# Patient Record
Sex: MALE | Race: WHITE
[De-identification: names, ages, dates, MRNs, and addresses within clinical notes are randomized per-mention and may not be internally consistent; named-entity substitution may affect disease eponyms.]

---

## 2017-08-18 ENCOUNTER — HOSPITAL ENCOUNTER (OUTPATIENT)
Dept: HOSPITAL 58 - NONPT | Age: 52
Discharge: HOME | End: 2017-08-18
Attending: GENERAL PRACTICE

## 2017-08-18 VITALS — BODY MASS INDEX: 31.5 KG/M2

## 2017-08-18 DIAGNOSIS — Z12.11: Primary | ICD-10-CM

## 2017-08-18 LAB
HEMOCCULT SP1 SPEC QL: NEGATIVE
OCCULT BLOOD INTERNAL QC 1: (no result)

## 2017-08-18 PROCEDURE — 82272 OCCULT BLD FECES 1-3 TESTS: CPT

## 2017-08-21 ENCOUNTER — HOSPITAL ENCOUNTER (OUTPATIENT)
Dept: HOSPITAL 58 - LAB | Age: 52
Discharge: HOME | End: 2017-08-21
Attending: GENERAL PRACTICE

## 2017-08-21 VITALS — BODY MASS INDEX: 31.5 KG/M2

## 2017-08-21 DIAGNOSIS — R35.0: Primary | ICD-10-CM

## 2017-08-21 DIAGNOSIS — Z12.5: ICD-10-CM

## 2017-08-21 DIAGNOSIS — Z76.89: ICD-10-CM

## 2017-08-21 DIAGNOSIS — I10: ICD-10-CM

## 2017-08-21 LAB
ADD URINE MICROSCOPIC: YES
ALBUMIN SERPL-MCNC: 4 G/DL (ref 3.4–5)
ALBUMIN/GLOB SERPL: 1.29 {RATIO}
ALP SERPL-CCNC: 46 U/L (ref 50–136)
ALT SERPL-CCNC: 12 U/L (ref 12–78)
ANION GAP SERPL CALC-SCNC: 13.2 MMOL/L
AST SERPL-CCNC: 18 U/L (ref 15–37)
BASOPHILS # BLD AUTO: 0.1 K/UL (ref 0–0.2)
BASOPHILS NFR BLD AUTO: 1.3 % (ref 0–3)
BILIRUB SERPL-MCNC: 0.44 MG/DL (ref 0–1.2)
BUN SERPL-MCNC: 12 MG/DL (ref 7–18)
BUN/CREAT SERPL: 13.95
CALCIUM SERPL-MCNC: 9.9 MG/DL (ref 8.2–10.2)
CHLORIDE SERPL-SCNC: 107 MMOL/L (ref 98–107)
CHOLEST SERPL-MCNC: 222 MG/DL (ref 0–200)
CHOLEST/HDLC SERPL: 4.1 {RATIO} (ref 4.5–6.4)
CO2 BLD-SCNC: 26 MMOL/L (ref 21–32)
CREAT SERPL-MCNC: 0.86 MG/DL (ref 0.6–1.1)
EOSINOPHIL # BLD AUTO: 0.2 K/UL (ref 0–0.7)
EOSINOPHIL NFR BLD AUTO: 3.2 % (ref 0–7)
GFR SERPLBLD BASED ON 1.73 SQ M-ARVRAT: 93 ML/MIN
GLOBULIN SER CALC-MCNC: 3.1 G/L
GLUCOSE SERPL-MCNC: 95 MG/DL (ref 70–100)
HCT VFR BLD AUTO: 33.7 % (ref 42–52)
HDLC SERPL-MCNC: 54 MG/DL (ref 35–60)
HGB BLD-MCNC: 11.6 G/DL (ref 14–18)
IMM GRANULOCYTES NFR BLD AUTO: 0.2 % (ref 0–5)
LYMPHOCYTES # SPEC AUTO: 2.2 K/UL (ref 0.6–3.4)
LYMPHOCYTES NFR BLD AUTO: 40.9 % (ref 10–50)
MCH RBC QN: 31.5 PG (ref 27–31)
MCHC RBC AUTO-ENTMCNC: 34.4 G/DL (ref 31.8–35.4)
MCV RBC: 91.6 FL (ref 80–94)
MONOCYTES # BLD AUTO: 0.4 K/UL (ref 0.4–2)
MONOCYTES NFR BLD AUTO: 7.2 % (ref 0–10)
NEUTROPHILS # BLD AUTO: 2.5 K/UL (ref 2–6.9)
NEUTROPHILS NFR BLD AUTO: 47.2 %
PH UR: 5.5 [PH] (ref 5–9)
PLATELET # BLD AUTO: 352 10^3/UL (ref 140–440)
POTASSIUM SERPL-SCNC: 4.2 MMOL/L (ref 3.5–5.1)
PROT SERPL-MCNC: 7.1 G/DL (ref 6.4–8.2)
RBC # BLD AUTO: 3.68 10^6/UL (ref 4.7–6.1)
SODIUM SERPL-SCNC: 142 MMOL/L (ref 136–145)
SP GR UR: >=1.03 (ref 1–1.03)
TRIGL SERPL-MCNC: 82 MG/DL (ref 30–150)
VLDLC SERPL CALC-MCNC: 16 MG/DL (ref 2–30)
WBC # BLD AUTO: 5.28 K/UL (ref 4.2–10.2)

## 2017-08-21 PROCEDURE — 80053 COMPREHEN METABOLIC PANEL: CPT

## 2017-08-21 PROCEDURE — 36415 COLL VENOUS BLD VENIPUNCTURE: CPT

## 2017-08-21 PROCEDURE — 81001 URINALYSIS AUTO W/SCOPE: CPT

## 2017-08-21 PROCEDURE — 87086 URINE CULTURE/COLONY COUNT: CPT

## 2017-08-21 PROCEDURE — 80061 LIPID PANEL: CPT

## 2017-08-21 PROCEDURE — 85025 COMPLETE CBC W/AUTO DIFF WBC: CPT

## 2018-02-27 ENCOUNTER — HOSPITAL ENCOUNTER (OUTPATIENT)
Dept: HOSPITAL 58 - FCC-LAB | Age: 53
Discharge: HOME | End: 2018-02-27
Attending: GENERAL PRACTICE

## 2018-02-27 VITALS — BODY MASS INDEX: 31.5 KG/M2

## 2018-02-27 DIAGNOSIS — I10: ICD-10-CM

## 2018-02-27 DIAGNOSIS — F10.10: ICD-10-CM

## 2018-02-27 DIAGNOSIS — M25.511: Primary | ICD-10-CM

## 2018-02-27 DIAGNOSIS — D64.9: ICD-10-CM

## 2018-02-27 PROCEDURE — 85025 COMPLETE CBC W/AUTO DIFF WBC: CPT

## 2018-02-27 PROCEDURE — 80061 LIPID PANEL: CPT

## 2018-02-27 PROCEDURE — 36415 COLL VENOUS BLD VENIPUNCTURE: CPT

## 2018-02-27 PROCEDURE — 80053 COMPREHEN METABOLIC PANEL: CPT

## 2018-02-27 NOTE — DI
Exam:  Three x-rays of the right shoulder. 

  

Comparison:  None available. 

  

Reason for exam:  Pain in right shoulder. 

  

FINDINGS:  No acute fracture or dislocation.  The humeral head articulates to the bony glenoid.  The 
clavicle is intact.  The scapular Y-view appears grossly unremarkable.  No unexplained calcific soft 
tissue density or radiopaque retained foreign body. 

  

Impression:  No acute fracture or dislocation in the right shoulder.

## 2018-06-23 ENCOUNTER — HOSPITAL ENCOUNTER (EMERGENCY)
Dept: HOSPITAL 58 - ED | Age: 53
Discharge: HOME | End: 2018-06-23

## 2018-06-23 VITALS — BODY MASS INDEX: 28.7 KG/M2

## 2018-06-23 VITALS — SYSTOLIC BLOOD PRESSURE: 122 MMHG | TEMPERATURE: 97.9 F | DIASTOLIC BLOOD PRESSURE: 83 MMHG

## 2018-06-23 DIAGNOSIS — S61.411A: Primary | ICD-10-CM
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
## 2018-06-23 PROCEDURE — 90471 IMMUNIZATION ADMIN: CPT

## 2018-06-23 PROCEDURE — 90715 TDAP VACCINE 7 YRS/> IM: CPT

## 2018-06-23 PROCEDURE — 99283 EMERGENCY DEPT VISIT LOW MDM: CPT

## 2018-06-23 NOTE — ED.PDOC
General


ED Provider: 


Dr. RADHA GUTHRIE





Chief Complaint: Hand Laceration


Stated Complaint: Patient states he sustained a laceration accidentally with a 

[place] today.


Time Seen by Physician: 17:38


Mode of Arrival: Walk-In


Information Source: Patient


[facility] 


[facility]





Nursing and Triage Documentation Reviewed and Agree: Yes


Does patient meet sepsis criteria?: No


If yes, has appropriate treatment been initiated?: No


System Inflammatory Response Syndrome: Not Applicable


Sepsis Protocol: 


For patient's 13 years and over:





Temp is 96.8 and below  and greater


Pulse >90 BPM


Resp >20/minute


Acutely Altered Mental Status





Are patient's symptoms suggestive of a new infection, such as:


   -Pneumonia


   -Skin, Soft Tissue


   -Endocarditis


   -UTI


   -Bone, Joint Infection


   -Implantable Device


   -Acute Abdominal Infection


   -Wound Infection


   -Meningitis


   -Blood Stream Catheter Infection


   -Unknown








Skin Complaint Exam





- Laceration/Abrasion/Hand Complaint/Exam


Location of Injury: Right


Mechanism of Injury: Laceration


Onset/Duration: 1 hour prior 


Symptoms Are: Still present


Initial Severity: Moderate


Current Severity: Mild


Alleviating: Compression


Associated Signs and Symptoms: Denies: Fever, Chills, Erythema, Numbness, 

Tingling


Related History: Reports: Right hand dominant


Hand Picture: 


  __________________________














  __________________________





 1 - 3 cm laceration linear





Differential Diagnoses: Laceration





Review of Systems





- Review Of Systems


Constitutional: Reports: No symptoms


Eyes: Reports: No symptoms


Ears, Nose, Mouth, Throat: Reports: No symptoms


Respiratory: Reports: No symptoms


Cardiac: Reports: No symptoms


GI: Reports: No symptoms


: Reports: No symptoms


Musculoskeletal: Reports: No symptoms


Skin: Reports: Other (hand laceration )


Neurological: Reports: No symptoms


Endocrine: Reports: No symptoms


Hematologic/Lymphatic: Reports: No symptoms


All Other Systems: Reviewed and Negative





Past Medical History





- Past Medical History


Previously Healthy: Yes


Endocrine: Reports: None


Cardiovascular: Reports: None


Respiratory: Reports: None


Hematological: Reports: None


Gastrointestinal: Reports: None


Genitourinary: Reports: None


Neuro/Psych: Reports: None


Musculoskeletal: Reports: None


Cancer: Reports: None





- Surgical History


General Surgical History: Reports: None





- Family History


Family History: Reports: None





- Social History


Smoking Status: Former smoker


Hx Substance Use: No


Alcohol Screening: Occasionally





Physical Exam





- Physical Exam


Appearance: Well-appearing


Pain Distress: Mild


Respiratory: Airway patent, Breath sounds clear, Breath sounds equal, 

Respirations nonlabored


Cardiovascular: RRR, Pulses normal, No rub, No murmur


Musculoskeletal: Normal strength, ROM intact, No edema


Skin: Warm, Dry ([place] right hand. )


Neurological: Motor intact, Reflexes intact, Alert, Oriented





Procedures





- Laceration/Wound Repair


  ** Right Dorsum of hand 


Wound Description: Linear


Wound Length (cm): 3


Wound Width: 1


Wound Depth: 0.2


Wound Explored: Clean


Wound Irrigated: Yes


Wound Prep: Hibiclens


Anesthesia: Lidocaine


Wound Repaired With: Sutures


Suture Size and Type: Prolene 4.0


Number of Sutures: 13 (Running )


Layer Closure?: No


Sterile Dressing Applied?: Yes


Splint Applied?: No


Progress: 





Tolerated procedure well. 





Re-Evaluation





- Re-Evaluation


Time of Re-Evaluation: 18:08


Status: Improved





Critical Care Note





- Critical Care Note


Total Time (mins): 0





Course





- Course


Orders, Labs, Meds: 


Orders











 Category Date Time Status


 


 Diphth,Pertuss(Acell),Tet Vac [Boostrix] MEDS  06/23/18 18:03 Once





 0.5 ml IM .ONCE ONE   


 


 Lidocaine HCl/Pf [Lidocaine HCl 1% Sdv] MEDS  06/23/18 17:40 Discontinued





 5 ml SUBCUT ONCE STA   








Medications














Discontinued Medications














Generic Name Dose Route Start Last Admin





  Trade Name Freq  PRN Reason Stop Dose Admin


 


Diphtheria/Pertussis/Tetanus Vacc  0.5 ml  06/23/18 18:03  





  Boostrix  IM  06/23/18 18:04  





  .ONCE ONE   





     





     





     





     


 


Lidocaine HCl  5 ml  06/23/18 17:40  





  Lidocaine Hcl 1% Sdv  SUBCUT  06/23/18 17:41  





  ONCE STA   





     





     





     





     











Vital Signs: 


 











  Temp Pulse Resp BP Pulse Ox


 


 06/23/18 16:41  97.9 F  70  20  122/83  95














Departure





- Departure


Time of Disposition: 18:07


Disposition: HOME SELF-CARE


Discharge Problem: 


 Laceration of hand





Instructions:  Laceration (ED)


Condition: Stable


Pt referred to PMD for follow-up: Yes


IPMP verified?: No


Additional Instructions: 


Keep wound clean an Dry


Have sutures removed in 7-10 days 


Report any sings of infection 


Allergies/Adverse Reactions: 


Allergies





No Known Allergies Allergy (Verified 06/23/18 16:44)


 








Home Medications: 


Ambulatory Orders





Multivitamin with Minerals [One Daily] 1 each PO DAILY 05/07/14 








Disposition Discussed With: Patient, Family

## 2019-01-14 ENCOUNTER — HOSPITAL ENCOUNTER (EMERGENCY)
Dept: HOSPITAL 58 - ED | Age: 54
Discharge: HOME | End: 2019-01-14

## 2019-01-14 VITALS — SYSTOLIC BLOOD PRESSURE: 135 MMHG | DIASTOLIC BLOOD PRESSURE: 85 MMHG | TEMPERATURE: 100.3 F

## 2019-01-14 VITALS — BODY MASS INDEX: 31.1 KG/M2

## 2019-01-14 DIAGNOSIS — J40: Primary | ICD-10-CM

## 2019-01-14 DIAGNOSIS — J02.9: ICD-10-CM

## 2019-01-14 DIAGNOSIS — B34.9: ICD-10-CM

## 2019-01-14 PROCEDURE — 99282 EMERGENCY DEPT VISIT SF MDM: CPT

## 2019-01-14 NOTE — ED.PDOC
General


ED Provider: 


Dr. JOCELYNN ALEXIS





Chief Complaint: Respiratory Complaint


Stated Complaint: cough.congestion flu like symptoms


Time Seen by Physician: 09:00


Mode of Arrival: Walk-In


Information Source: Patient


Exam Limitations: No limitations


[facility] 


[facility]





Nursing and Triage Documentation Reviewed and Agree: Yes


Does patient meet sepsis criteria?: No


System Inflammatory Response Syndrome: Not Applicable


Sepsis Protocol: 


For patient's 13 years and over:





Temp is 96.8 and below  and greater


Pulse >90 BPM


Resp >20/minute


Acutely Altered Mental Status





Are patient's symptoms suggestive of a new infection, such as:


   -Pneumonia


   -Skin, Soft Tissue


   -Endocarditis


   -UTI


   -Bone, Joint Infection


   -Implantable Device


   -Acute Abdominal Infection


   -Wound Infection


   -Meningitis


   -Blood Stream Catheter Infection


   -Unknown








Respiratory Complaint Exam





- Respiratory Complaint/Exam


Symptoms Are: Resolved


Timing: Intermittent


Initial Severity: Mild


Current Severity: None


Location: Nose, Throat, Chest


Character: Reports: Non-productive cough


Aggravating: Reports: URI


Alleviating: Reports: Spontaneous resolution


Associated Signs and Symptoms: Reports: URI, Nasal congestion.  Denies: Rapid 

breathing, Dyspnea, Fever, Chills, Chest pain, Pleuritic chest pain, Wheezing, 

Hemoptysis, Dizziness, Calf pain, Calf swelling, Edema, Hoarseness, Sinus 

discomfort, Vomiting, Sore throat, Weight loss, Decreased oral intake, 

Increased thirst, Increased appetite, Increased urination


History of Healthcare-Acquired Pneumonia: No


Related Surgical History: Reports: None


Pulmonary Embolism Risk Factors: None


Cardiac Risk Factors: Reports: None


Pseudomonas Risk Factors: Reports: None


Tuberculosis Risk Factors: Reports: None


Status Asthmaticus Risk Factors: Reports: None


Home Oxygen Use: No


Recent Stress Test: No


Recent Echo/LV Function: No


Current Antibiotic Use: No


Current Asthma Medication Use: No


Respiratory Distress: None


Inadequate Respiratory Effort: No


Dysphagia Present: No


Stridor Present: No


JVD Present: No


Accessory Muscle Use: No


Retractions: Not Present


Diminished Breath Sounds: No


Sinus Tenderness: None


Differential Diagnoses: Pneumonia, Bronchitis, URI





Review of Systems





- Review Of Systems


Constitutional: Reports: Chills, Malaise


Eyes: Reports: No symptoms


Ears, Nose, Mouth, Throat: Reports: Throat pain


Respiratory: Reports: Cough


Cardiac: Reports: No symptoms


GI: Reports: No symptoms


: Reports: No symptoms


Musculoskeletal: Reports: No symptoms


Skin: Reports: No symptoms


Neurological: Reports: No symptoms


Endocrine: Reports: No symptoms


Hematologic/Lymphatic: Reports: No symptoms


All Other Systems: Reviewed and Negative





Past Medical History





- Past Medical History


Previously Healthy: Yes


Endocrine: Reports: None


Cardiovascular: Reports: None


Respiratory: Reports: None


Hematological: Reports: None


Gastrointestinal: Reports: None


Genitourinary: Reports: None


Neuro/Psych: Reports: None


Musculoskeletal: Reports: None


Cancer: Reports: None





- Surgical History


General Surgical History: Reports: None





- Family History


Family History: Reports: None





- Social History


Smoking Status: Former smoker


Hx Substance Use: No


Alcohol Screening: Occasionally





Physical Exam





- Physical Exam


Appearance: Well-appearing, No pain distress, Well-nourished


Eyes: JANETTE, EOMI, Conjunctiva clear


ENT: Erythema


Respiratory: Airway patent, Breath sounds clear, Breath sounds equal, 

Respirations nonlabored


Cardiovascular: RRR, Pulses normal, No rub, No murmur


GI/: Soft, Nontender, No masses, Bowel sounds normal, No Organomegaly


Musculoskeletal: Normal strength, ROM intact, No edema, No calf tenderness


Skin: Warm, Dry, Normal color


Neurological: Sensation intact, Motor intact, Reflexes intact, Cranial nerves 

intact, Alert, Oriented


Psychiatric: Affect appropriate, Mood appropriate





Critical Care Note





- Critical Care Note


Total Time (mins): 0





Course





- Course


Vital Signs: 





 











  Temp Pulse Resp BP Pulse Ox


 


 01/14/19 08:44  100.3 F H  79  20  135/85  94 L














Departure





- Departure


Time of Disposition: 09:07


Disposition: HOME SELF-CARE


Discharge Problem: 


 Bronchitis, Viral syndrome





Pharyngitis


Qualifiers:


 Pharyngitis/tonsillitis etiology: unspecified etiology Qualified Code(s): 

J02.9 - Acute pharyngitis, unspecified





Instructions:  Viral Syndrome (ED)


Condition: Good


Pt referred to PMD for follow-up: Yes


IPMP verified?: No


Additional Instructions: 


Please call your Family Physician as soon as possible to schedule a follow-up 

appointment.


Allergies/Adverse Reactions: 


Allergies





No Known Allergies Allergy (Verified 01/14/19 08:46)


 








Home Medications: 


Ambulatory Orders





Multivitamin with Minerals [One Daily] 1 each PO DAILY 05/07/14 


Loratadine [Claritin] 10 mg PO EVERY OTHER DAY 01/14/19

## 2019-01-28 ENCOUNTER — HOSPITAL ENCOUNTER (EMERGENCY)
Dept: HOSPITAL 58 - ED | Age: 54
Discharge: HOME | End: 2019-01-28

## 2019-01-28 VITALS — BODY MASS INDEX: 30.2 KG/M2

## 2019-01-28 VITALS — SYSTOLIC BLOOD PRESSURE: 120 MMHG | DIASTOLIC BLOOD PRESSURE: 83 MMHG | TEMPERATURE: 99 F

## 2019-01-28 DIAGNOSIS — R68.89: ICD-10-CM

## 2019-01-28 DIAGNOSIS — R05: Primary | ICD-10-CM

## 2019-01-28 DIAGNOSIS — D64.9: ICD-10-CM

## 2019-01-28 PROCEDURE — 87502 INFLUENZA DNA AMP PROBE: CPT

## 2019-01-28 PROCEDURE — 99283 EMERGENCY DEPT VISIT LOW MDM: CPT

## 2019-01-28 PROCEDURE — 87651 STREP A DNA AMP PROBE: CPT

## 2019-01-28 PROCEDURE — 85025 COMPLETE CBC W/AUTO DIFF WBC: CPT

## 2019-01-28 PROCEDURE — 36415 COLL VENOUS BLD VENIPUNCTURE: CPT

## 2019-01-28 NOTE — ED.PDOC
General


ED Provider: 


Dr. JOCELYNN ALEXIS





Chief Complaint: Respiratory Complaint


Stated Complaint: flu like symptoms see 2 weeks ago stated he is not improved


Time Seen by Physician: 09:15 (seen with aLIBBY AT ALL TIMES NO RESPIRATORY 

DISTRESS, TACHYPNEA )


Mode of Arrival: Walk-In


Information Source: Patient


Exam Limitations: No limitations


[facility] 


[facility]





Nursing and Triage Documentation Reviewed and Agree: Yes


Does patient meet sepsis criteria?: No


If yes, has appropriate treatment been initiated?: No


System Inflammatory Response Syndrome: Not Applicable


Sepsis Protocol: 


For patient's 13 years and over:





Temp is 96.8 and below  and greater


Pulse >90 BPM


Resp >20/minute


Acutely Altered Mental Status





Are patient's symptoms suggestive of a new infection, such as:


   -Pneumonia


   -Skin, Soft Tissue


   -Endocarditis


   -UTI


   -Bone, Joint Infection


   -Implantable Device


   -Acute Abdominal Infection


   -Wound Infection


   -Meningitis


   -Blood Stream Catheter Infection


   -Unknown








Respiratory Complaint Exam





- Respiratory Complaint/Exam


Onset/Duration: 14 DAYS 


Symptoms Are: Still present


Timing: Intermittent


Initial Severity: Mild


Current Severity: Mild


Location: Nose, Throat, Chest


Character: Reports: Non-productive cough, Dry cough


Aggravating: Reports: URI


Alleviating: Reports: None


Associated Signs and Symptoms: Reports: URI, Nasal congestion.  Denies: Rapid 

breathing, Dyspnea, Fever, Chills, Chest pain, Pleuritic chest pain, Wheezing, 

Hemoptysis, Dizziness, Calf pain, Calf swelling, Edema, Hoarseness, Sinus 

discomfort, Vomiting, Sore throat, Weight loss, Decreased oral intake, 

Increased thirst, Increased appetite, Increased urination


Related History: Reports: Similar episode


History of Healthcare-Acquired Pneumonia: No


Related Surgical History: Reports: None


Pulmonary Embolism Risk Factors: None


Cardiac Risk Factors: Reports: None


Pseudomonas Risk Factors: Reports: None


Tuberculosis Risk Factors: Reports: None


Status Asthmaticus Risk Factors: Reports: None


Home Oxygen Use: No


Recent Stress Test: No


Recent Echo/LV Function: No


Current Antibiotic Use: No


Current Asthma Medication Use: No


Respiratory Distress: None


Inadequate Respiratory Effort: No


Dysphagia Present: No


Stridor Present: No


JVD Present: No


Accessory Muscle Use: No


Retractions: Not Present


Diminished Breath Sounds: No


Sinus Tenderness: None


Grunting Respirations: No


Kussmaul Respirations: No


Differential Diagnoses: Pneumonia, Bronchitis





Review of Systems





- Review Of Systems


Constitutional: Reports: Malaise


Eyes: Reports: No symptoms


Ears, Nose, Mouth, Throat: Reports: No symptoms


Respiratory: Reports: Cough


Cardiac: Reports: No symptoms


GI: Reports: No symptoms


: Reports: No symptoms


Musculoskeletal: Reports: No symptoms


Skin: Reports: No symptoms


Neurological: Reports: No symptoms


Endocrine: Reports: No symptoms


Hematologic/Lymphatic: Reports: No symptoms


All Other Systems: Reviewed and Negative





Past Medical History





- Past Medical History


Previously Healthy: Yes


Endocrine: Reports: None


Cardiovascular: Reports: None


Respiratory: Reports: None


Hematological: Reports: None


Gastrointestinal: Reports: None


Genitourinary: Reports: None


Neuro/Psych: Reports: None


Musculoskeletal: Reports: None


Cancer: Reports: None





- Surgical History


General Surgical History: Reports: None





- Family History


Family History: Reports: None





- Social History


Smoking Status: Former smoker


Hx Substance Use: No


Alcohol Screening: None





Physical Exam





- Physical Exam


Appearance: Well-appearing, No pain distress, Well-nourished


Eyes: JANETTE, EOMI, Conjunctiva clear


ENT: Ears normal, Nose normal, Oropharynx normal


Respiratory: Airway patent, Breath sounds clear, Breath sounds equal, 

Respirations nonlabored


Cardiovascular: RRR, Pulses normal, No rub, No murmur


GI/: Soft, Nontender, No masses, Bowel sounds normal, No Organomegaly


Musculoskeletal: Normal strength, ROM intact, No edema, No calf tenderness


Skin: Warm, Dry, Normal color


Neurological: Sensation intact, Motor intact, Reflexes intact, Cranial nerves 

intact, Alert, Oriented


Psychiatric: Affect appropriate, Mood appropriate





Critical Care Note





- Critical Care Note


Total Time (mins): 0





Course





- Course


Hematology/Chemistry: 


 01/28/19 09:38





Orders, Labs, Meds: 


Lab Review











  01/28/19





  09:38


 


WBC  7.64


 


RBC  3.92 L


 


Hgb  11.7 L


 


Hct  34.9 L


 


MCV  89.0


 


MCH  29.8


 


MCHC  33.5


 


RDW Coeff of Izabel  12.2


 


Plt Count  336


 


Immature Gran % (Auto)  0.3


 


Neut % (Auto)  63.4


 


Lymph % (Auto)  23.6


 


Mono % (Auto)  8.4


 


Eos % (Auto)  3.4


 


Baso % (Auto)  0.9


 


Immature Gran # (Auto)  0.0


 


Neut # (Auto)  4.9


 


Lymph # (Auto)  1.8


 


Mono # (Auto)  0.6


 


Eos # (Auto)  0.3


 


Baso # (Auto)  0.1








Orders











 Category Date Time Status


 


 CBC W/ AUTO DIFF Stat LAB  01/28/19 09:38 Completed


 


 FLU A/B MOLECULAR Stat LAB  01/28/19 09:25 Received


 


 MOLECULAR GROUP A STREP Stat LAB  01/28/19 09:25 Completed


 


 CHEST, 2 VIEWS PA & LAT Stat RADS  01/28/19 09:24 Ordered











Vital Signs: 


 











  Temp Pulse Resp BP Pulse Ox


 


 01/28/19 09:08  99.0 F  69  20  120/83  93 L














Departure





- Departure


Time of Disposition: 10:30


Disposition: HOME SELF-CARE


Discharge Problem: 


 Cough





Anemia


Qualifiers:


 Anemia type: unspecified type Qualified Code(s): D64.9 - Anemia, unspecified





Instructions:  Chronic Cough (ED), Anemia (ED)


Condition: Good


Pt referred to PMD for follow-up: Yes


IPMP verified?: No


Additional Instructions: 


Please call your Family Physician as soon as possible to schedule a follow-up 

appointment. YOUR ANEMIC THIS ANEMIA MAY BE RELATED TO A ISSUE IN YOUR COLON. 

MAKE SURE YOU SEE YOUR M.D. TO GET THIS PROCEDURE DONE TO RULE OUT COLON ISSUES 

LIKE POLYPS, CANCER  ECT 


Allergies/Adverse Reactions: 


Allergies





No Known Allergies Allergy (Verified 01/14/19 08:46)


 








Home Medications: 


Ambulatory Orders





Multivitamin with Minerals [One Daily] 1 each PO DAILY 05/07/14 


Loratadine [Claritin] 10 mg PO EVERY OTHER DAY 01/14/19 








Disposition Discussed With: Patient

## 2019-01-28 NOTE — DI
EXAM:  Chest two views 

  

HISTORY:  Cough 

  

COMPARISON:  05/07/2014 

  

TECHNIQUE:  Two views of the chest were performed 

  

FINDINGS:  The lungs are clear.  There is no pleural effusion or pneumothorax.  The heart is normal i
n size.  The mediastinal contour is normal.  There are no acute abnormalities of the bones. 

  

IMPRESSION:  No acute cardiopulmonary process.